# Patient Record
Sex: MALE | Race: WHITE | NOT HISPANIC OR LATINO | Employment: FULL TIME | URBAN - METROPOLITAN AREA
[De-identification: names, ages, dates, MRNs, and addresses within clinical notes are randomized per-mention and may not be internally consistent; named-entity substitution may affect disease eponyms.]

---

## 2018-12-10 ENCOUNTER — OFFICE VISIT (OUTPATIENT)
Dept: URGENT CARE | Facility: CLINIC | Age: 38
End: 2018-12-10
Payer: COMMERCIAL

## 2018-12-10 VITALS
DIASTOLIC BLOOD PRESSURE: 81 MMHG | BODY MASS INDEX: 35.55 KG/M2 | HEART RATE: 71 BPM | SYSTOLIC BLOOD PRESSURE: 128 MMHG | TEMPERATURE: 99 F | OXYGEN SATURATION: 99 % | HEIGHT: 69 IN | WEIGHT: 240 LBS

## 2018-12-10 DIAGNOSIS — M62.838 MUSCLE SPASM: ICD-10-CM

## 2018-12-10 DIAGNOSIS — M54.50 ACUTE RIGHT-SIDED LOW BACK PAIN WITHOUT SCIATICA: Primary | ICD-10-CM

## 2018-12-10 PROCEDURE — 96372 THER/PROPH/DIAG INJ SC/IM: CPT | Mod: S$GLB,,, | Performed by: INTERNAL MEDICINE

## 2018-12-10 PROCEDURE — 99203 OFFICE O/P NEW LOW 30 MIN: CPT | Mod: 25,S$GLB,, | Performed by: NURSE PRACTITIONER

## 2018-12-10 RX ORDER — PREDNISONE 20 MG/1
20 TABLET ORAL DAILY
Qty: 5 TABLET | Refills: 0 | Status: SHIPPED | OUTPATIENT
Start: 2018-12-10 | End: 2018-12-15

## 2018-12-10 RX ORDER — OMEPRAZOLE 20 MG/1
20 CAPSULE, DELAYED RELEASE ORAL DAILY
COMMUNITY

## 2018-12-10 RX ORDER — KETOROLAC TROMETHAMINE 30 MG/ML
60 INJECTION, SOLUTION INTRAMUSCULAR; INTRAVENOUS
Status: COMPLETED | OUTPATIENT
Start: 2018-12-10 | End: 2018-12-10

## 2018-12-10 RX ORDER — BETAMETHASONE SODIUM PHOSPHATE AND BETAMETHASONE ACETATE 3; 3 MG/ML; MG/ML
6 INJECTION, SUSPENSION INTRA-ARTICULAR; INTRALESIONAL; INTRAMUSCULAR; SOFT TISSUE
Status: COMPLETED | OUTPATIENT
Start: 2018-12-10 | End: 2018-12-10

## 2018-12-10 RX ORDER — NAPROXEN 500 MG/1
500 TABLET ORAL 2 TIMES DAILY WITH MEALS
Qty: 60 TABLET | Refills: 0 | Status: SHIPPED | OUTPATIENT
Start: 2018-12-10 | End: 2019-12-10

## 2018-12-10 RX ORDER — CYCLOBENZAPRINE HCL 10 MG
10 TABLET ORAL 3 TIMES DAILY PRN
Qty: 30 TABLET | Refills: 0 | Status: SHIPPED | OUTPATIENT
Start: 2018-12-10 | End: 2019-01-09

## 2018-12-10 RX ADMIN — BETAMETHASONE SODIUM PHOSPHATE AND BETAMETHASONE ACETATE 6 MG: 3; 3 INJECTION, SUSPENSION INTRA-ARTICULAR; INTRALESIONAL; INTRAMUSCULAR; SOFT TISSUE at 09:12

## 2018-12-10 RX ADMIN — KETOROLAC TROMETHAMINE 60 MG: 30 INJECTION, SOLUTION INTRAMUSCULAR; INTRAVENOUS at 09:12

## 2018-12-10 NOTE — LETTER
December 10, 2018      [facility]  [place]  [address] [place] [address]  Phone: [phone]  Fax: [phone]       Patient: Jose Antonio Dove   YOB: 1980  Date of Visit: 12/10/2018    To Whom It May Concern:    Mulu Dove  was at [facility] on 12/10/2018. He may return to work/school on 12/14/18 with no restrictions. Please excuse 12/9/18 as well.  If you have any questions or concerns, or if I can be of further assistance, please do not hesitate to contact me.    Sincerely,              Aleja Richardson NP

## 2018-12-10 NOTE — PATIENT INSTRUCTIONS
Back Pain (Acute or Chronic)    Back pain is one of the most common problems. The good news is that most people feel better in 1 to 2 weeks, and most of the rest in 1 to 2 months. Most people can remain active.  People experience and describe pain differently; not everyone is the same.  · The pain can be sharp, stabbing, shooting, aching, cramping or burning.  · Movement, standing, bending, lifting, sitting, or walking may worsen pain.  · It can be localized to one spot or area, or it can be more generalized.  · It can spread or radiate upwards, to the front, or go down your arms or legs (sciatica).  · It can cause muscle spasm.  Most of the time, mechanical problems with the muscles or spine cause the pain. Mechanical problems are usually caused by an injury to the muscles or ligaments. While illness can cause back pain, it is usually not caused by a serious illness. Mechanical problems include:   · Physical activity such as sports, exercise, work, or normal activity  · Overexertion, lifting, pushing, pulling incorrectly or too aggressively  · Sudden twisting, bending, or stretching from an accident, or accidental movement  · Poor posture  · Stretching or moving wrong, without noticing pain at the time  · Poor coordination, lack of regular exercise (check with your doctor about this)  · Spinal disc disease or arthritis  · Stress  Pain can also be related to pregnancy, or illness like appendicitis, bladder or kidney infections, pelvic infections, and many other things.  Acute back pain usually gets better in 1 to 2 weeks. Back pain related to disk disease, arthritis in the spinal joints or spinal stenosis (narrowing of the spinal canal) can become chronic and last for months or years.  Unless you had a physical injury (for example, a car accident or fall) X-rays are usually not needed for the initial evaluation of back pain. If pain continues and does not respond to medical treatment, X-rays and other tests may be  needed.  Home care  Try these home care recommendations:  · When in bed, try to find a position of comfort. A firm mattress is best. Try lying flat on your back with pillows under your knees. You can also try lying on your side with your knees bent up towards your chest and a pillow between your knees.  · At first, do not try to stretch out the sore spots. If there is a strain, it is not like the good soreness you get after exercising without an injury. In this case, stretching may make it worse.  · Avoid prolong sitting, long car rides, or travel. This puts more stress on the lower back than standing or walking.  · During the first 24 to 72 hours after an acute injury or flare up of chronic back pain, apply an ice pack to the painful area for 20 minutes and then remove it for 20 minutes. Do this over a period of 60 to 90 minutes or several times a day. This will reduce swelling and pain. Wrap the ice pack in a thin towel or plastic to protect your skin.  · You can start with ice, then switch to heat. Heat (hot shower, hot bath, or heating pad) reduces pain and works well for muscle spasms. Heat can be applied to the painful area for 20 minutes then remove it for 20 minutes. Do this over a period of 60 to 90 minutes or several times a day. Do not sleep on a heating pad. It can lead to skin burns or tissue damage.  · You can alternate ice and heat therapy. Talk with your doctor about the best treatment for your back pain.  · Therapeutic massage can help relax the back muscles without stretching them.  · Be aware of safe lifting methods and do not lift anything without stretching first.  Medicines  Talk to your doctor before using medicine, especially if you have other medical problems or are taking other medicines.  · You may use over-the-counter medicine as directed on the bottle to control pain, unless another pain medicine was prescribed. If you have chronic conditions like diabetes, liver or kidney disease,  stomach ulcers, or gastrointestinal bleeding, or are taking blood thinners, talk to your doctor before taking any medicine.  · Be careful if you are given a prescription medicines, narcotics, or medicine for muscle spasms. They can cause drowsiness, affect your coordination, reflexes, and judgement. Do not drive or operate heavy machinery.  Follow-up care  Follow up with your healthcare provider, or as advised.   A radiologist will review any X-rays that were taken. Your provide will notify you of any new findings that may affect your care.  Call 911  Call emergency services if any of the following occur:  · Trouble breathing  · Confusion  · Very drowsy or trouble awakening  · Fainting or loss of consciousness  · Rapid or very slow heart rate  · Loss of bowel or bladder control  When to seek medical advice  Call your healthcare provider right away if any of these occur:   · Pain becomes worse or spreads to your legs  · Weakness or numbness in one or both legs  · Numbness in the groin or genital area  Date Last Reviewed: 7/1/2016 © 2000-2017 [employer]. [address] [place] [address]. All rights reserved. This information is not intended as a substitute for professional medical care. Always follow your healthcare professional's instructions.        Self-Care for Low Back Pain    Most people have low back pain now and then. In many cases, it isnt serious and self-care can help. Sometimes low back pain can be a sign of a bigger problem. Call your healthcare provider if your pain returns often or gets worse over time. For the long-term care of your back, get regular exercise, lose any excess weight and learn good posture.  Take a short rest  Lying down during the day may be beneficial for short periods of time if severe pain increases with sitting or standing. Long-term bed rest could be detrimental.  Reduce pain and swelling  Cold reduces swelling. Both cold and heat can reduce pain. Protect  your skin by placing a towel between your body and the ice or heat source.  · For the first few days, apply an ice pack for 15 to 20 minutes .  · After the first few days, try heat for 15 minutes at a time to ease pain. Never sleep on a heating pad.  · Over-the-counter medicine can help control pain and swelling. Try aspirin or ibuprofen.  Exercise  Exercise can help your back heal. It also helps your back get stronger and more flexible, preventing any reinjury. Ask your healthcare provider about specific exercises for your back.  Use good posture to avoid reinjury  · When moving, bend at the hips and knees. Dont bend at the waist or twist around.  · When lifting, keep the object close to your body. Dont try to lift more than you can handle.  · When sitting, keep your lower back supported. Use a rolled-up towel as needed.  Seek immediate medical care if:  · Youre unable to stand or walk.  · You have a temperature over 100.4°F (38.0°C)  · You have frequent, painful, or bloody urination.  · You have severe abdominal pain.  · You have a sharp, stabbing pain.  · Your pain is constant.  · You have pain or numbness in your leg.  · You feel pain in a new area of your back.  · You notice that the pain isnt decreasing after more than a week.   Date Last Reviewed: 9/29/2015  © 0414-4949 [employer]. [address] [place] [address], [place] [address]. All rights reserved. This information is not intended as a substitute for professional medical care. Always follow your healthcare professional's instructions.        Relieving Back Pain  Back pain is a common problem. You can strain back muscles by lifting too much weight or just by moving the wrong way. Back strain can be uncomfortable, even painful. And it can take weeks or months to improve. To help yourself feel better and prevent future back strains, try these tips.  Important Note: Do not give aspirin to children or teens without first discussing it with your  healthcare provider.      ? Ice    Ice reduces muscle pain and swelling. It helps most during the first 24 to 48 hours after an injury.  · Wrap an ice pack or a bag of frozen peas in a thin towel. (Never place ice directly on your skin.)  · Place the ice where your back hurts the most.  · Dont ice for more than 20 minutes at a time.  · You can use ice several times a day.  ? Medicines  Over-the-counter pain relievers can include acetaminophen and anti-inflammatory medicines, which includes aspirin or ibuprofen. They can help ease discomfort. Some also reduce swelling.  · Tell your healthcare provider about any medicines you are already taking.  · Take medicines only as directed.  ? Heat  After the first 48 hours, heat can relax sore muscles and improve blood flow.  · Try a warm bath or shower. Or use a heating pad set on low. To prevent a burn, keep a cloth between you and the heating pad.  · Dont use a heating pad for more than 15 minutes at a time. Never sleep on a heating pad.  Date Last Reviewed: 9/1/2015  © 3036-3295 [employer]. [address] [place] [address]. All rights reserved. This information is not intended as a substitute for professional medical care. Always follow your healthcare professional's instructions.

## 2018-12-10 NOTE — PROGRESS NOTES
"Subjective:       Patient ID: Jose Antonio Dove is a 38 y.o. male.    Vitals:  height is 5' 9" (1.753 m) and weight is 108.9 kg (240 lb). His oral temperature is 98.5 °F (36.9 °C). His blood pressure is 128/81 and his pulse is 71. His oxygen saturation is 99%.     Chief Complaint: Back Pain    Back Pain   This is a new problem. The current episode started yesterday. The problem occurs constantly. The problem has been gradually worsening since onset. The pain is present in the lumbar spine. The quality of the pain is described as aching. The pain does not radiate. The pain is at a severity of 8/10. The pain is moderate. The pain is the same all the time. Stiffness is present all day. Pertinent negatives include no bladder incontinence, bowel incontinence, chest pain, dysuria, fever, headaches, leg pain, numbness, tingling or weakness. He has tried nothing for the symptoms.       Constitution: Negative for chills, fatigue and fever.   HENT: Negative for congestion and sore throat.    Neck: Negative for painful lymph nodes.   Cardiovascular: Negative for chest pain and leg swelling.   Eyes: Negative for double vision and blurred vision.   Respiratory: Negative for cough and shortness of breath.    Gastrointestinal: Negative for nausea, vomiting, diarrhea and bowel incontinence.   Genitourinary: Negative for dysuria, frequency, urgency and bladder incontinence.   Musculoskeletal: Positive for back pain. Negative for joint pain, joint swelling, muscle cramps and muscle ache.   Skin: Negative for color change, pale and rash.   Allergic/Immunologic: Negative for seasonal allergies.   Neurological: Negative for dizziness, history of vertigo, light-headedness, passing out, headaches and numbness.   Hematologic/Lymphatic: Negative for swollen lymph nodes, easy bruising/bleeding and history of blood clots. Does not bruise/bleed easily.   Psychiatric/Behavioral: Negative for nervous/anxious, sleep disturbance and depression. The " patient is not nervous/anxious.        Objective:      Physical Exam   Constitutional: He is oriented to person, place, and time. He appears well-developed and well-nourished.   HENT:   Head: Normocephalic and atraumatic.   Cardiovascular: Normal rate, regular rhythm and normal heart sounds.   Pulmonary/Chest: Effort normal and breath sounds normal.   Abdominal: Soft. Bowel sounds are normal.   Musculoskeletal: He exhibits tenderness. He exhibits no edema.        Back:    Neurological: He is alert and oriented to person, place, and time.   Skin: Skin is warm and dry. No pallor.   Psychiatric: He has a normal mood and affect. His behavior is normal. Judgment and thought content normal.   Nursing note and vitals reviewed.      Assessment:       1. Acute right-sided low back pain without sciatica    2. Muscle spasm        Plan:         1. Acute right-sided low back pain without sciatica    - betamethasone acetate-betamethasone sodium phosphate injection 6 mg  - ketorolac injection 60 mg  - cyclobenzaprine (FLEXERIL) 10 MG tablet; Take 1 tablet (10 mg total) by mouth 3 (three) times daily as needed for Muscle spasms.  Dispense: 30 tablet; Refill: 0  - predniSONE (DELTASONE) 20 MG tablet; Take 1 tablet (20 mg total) by mouth once daily. Starting tomorrow. for 5 days  Dispense: 5 tablet; Refill: 0  - naproxen (NAPROSYN) 500 MG tablet; Take 1 tablet (500 mg total) by mouth 2 (two) times daily with meals. Start tomorrow.  Dispense: 60 tablet; Refill: 0    2. Muscle spasm    - betamethasone acetate-betamethasone sodium phosphate injection 6 mg  - ketorolac injection 60 mg  - cyclobenzaprine (FLEXERIL) 10 MG tablet; Take 1 tablet (10 mg total) by mouth 3 (three) times daily as needed for Muscle spasms.  Dispense: 30 tablet; Refill: 0  - predniSONE (DELTASONE) 20 MG tablet; Take 1 tablet (20 mg total) by mouth once daily. Starting tomorrow. for 5 days  Dispense: 5 tablet; Refill: 0  - naproxen (NAPROSYN) 500 MG tablet; Take 1  tablet (500 mg total) by mouth 2 (two) times daily with meals. Start tomorrow.  Dispense: 60 tablet; Refill: 0

## 2018-12-13 ENCOUNTER — TELEPHONE (OUTPATIENT)
Dept: URGENT CARE | Facility: CLINIC | Age: 38
End: 2018-12-13

## 2018-12-17 ENCOUNTER — TELEPHONE (OUTPATIENT)
Dept: URGENT CARE | Facility: CLINIC | Age: 38
End: 2018-12-17

## 2021-06-29 ENCOUNTER — OFFICE VISIT (OUTPATIENT)
Dept: URGENT CARE | Facility: CLINIC | Age: 41
End: 2021-06-29
Payer: COMMERCIAL

## 2021-06-29 VITALS
HEART RATE: 94 BPM | OXYGEN SATURATION: 98 % | HEIGHT: 69 IN | TEMPERATURE: 99 F | RESPIRATION RATE: 18 BRPM | DIASTOLIC BLOOD PRESSURE: 94 MMHG | SYSTOLIC BLOOD PRESSURE: 129 MMHG | WEIGHT: 235 LBS | BODY MASS INDEX: 34.8 KG/M2

## 2021-06-29 DIAGNOSIS — Z11.59 SCREENING FOR VIRAL DISEASE: Primary | ICD-10-CM

## 2021-06-29 DIAGNOSIS — U07.1 COVID-19 VIRUS DETECTED: ICD-10-CM

## 2021-06-29 DIAGNOSIS — U07.1 COVID-19 VIRUS INFECTION: ICD-10-CM

## 2021-06-29 LAB
CTP QC/QA: YES
SARS-COV-2 RDRP RESP QL NAA+PROBE: POSITIVE

## 2021-06-29 PROCEDURE — U0002: ICD-10-PCS | Mod: QW,CR,S$GLB, | Performed by: PHYSICIAN ASSISTANT

## 2021-06-29 PROCEDURE — 99214 OFFICE O/P EST MOD 30 MIN: CPT | Mod: S$GLB,CS,, | Performed by: PHYSICIAN ASSISTANT

## 2021-06-29 PROCEDURE — U0002 COVID-19 LAB TEST NON-CDC: HCPCS | Mod: QW,CR,S$GLB, | Performed by: PHYSICIAN ASSISTANT

## 2021-06-29 PROCEDURE — 99214 PR OFFICE/OUTPT VISIT, EST, LEVL IV, 30-39 MIN: ICD-10-PCS | Mod: S$GLB,CS,, | Performed by: PHYSICIAN ASSISTANT

## 2021-06-29 RX ORDER — PREDNISONE 20 MG/1
20 TABLET ORAL DAILY
Qty: 5 TABLET | Refills: 0 | Status: SHIPPED | OUTPATIENT
Start: 2021-06-29 | End: 2021-07-04

## 2021-06-29 RX ORDER — AZITHROMYCIN 250 MG/1
250 TABLET, FILM COATED ORAL SEE ADMIN INSTRUCTIONS
Qty: 6 TABLET | Refills: 0 | Status: SHIPPED | OUTPATIENT
Start: 2021-06-29

## 2021-06-29 RX ORDER — PROMETHAZINE HYDROCHLORIDE AND DEXTROMETHORPHAN HYDROBROMIDE 6.25; 15 MG/5ML; MG/5ML
5 SYRUP ORAL EVERY 4 HOURS PRN
Qty: 180 ML | Refills: 0 | Status: SHIPPED | OUTPATIENT
Start: 2021-06-29 | End: 2021-07-09

## 2021-07-02 ENCOUNTER — INFUSION (OUTPATIENT)
Dept: INFECTIOUS DISEASES | Facility: HOSPITAL | Age: 41
End: 2021-07-02
Attending: FAMILY MEDICINE
Payer: COMMERCIAL

## 2021-07-02 VITALS
TEMPERATURE: 98 F | DIASTOLIC BLOOD PRESSURE: 70 MMHG | RESPIRATION RATE: 20 BRPM | HEART RATE: 72 BPM | SYSTOLIC BLOOD PRESSURE: 140 MMHG | OXYGEN SATURATION: 97 %

## 2021-07-02 DIAGNOSIS — U07.1 COVID-19: Primary | ICD-10-CM

## 2021-07-02 PROCEDURE — 25000003 PHARM REV CODE 250: Performed by: FAMILY MEDICINE

## 2021-07-02 PROCEDURE — M0243 CASIRIVI AND IMDEVI INFUSION: HCPCS | Performed by: FAMILY MEDICINE

## 2021-07-02 PROCEDURE — 63600175 PHARM REV CODE 636 W HCPCS: Performed by: FAMILY MEDICINE

## 2021-07-02 RX ORDER — ACETAMINOPHEN 325 MG/1
650 TABLET ORAL ONCE AS NEEDED
Status: ACTIVE | OUTPATIENT
Start: 2021-07-02 | End: 2032-11-28

## 2021-07-02 RX ORDER — SODIUM CHLORIDE 0.9 % (FLUSH) 0.9 %
10 SYRINGE (ML) INJECTION
Status: ACTIVE | OUTPATIENT
Start: 2021-07-02

## 2021-07-02 RX ORDER — DIPHENHYDRAMINE HYDROCHLORIDE 50 MG/ML
25 INJECTION INTRAMUSCULAR; INTRAVENOUS ONCE AS NEEDED
Status: ACTIVE | OUTPATIENT
Start: 2021-07-02 | End: 2032-11-28

## 2021-07-02 RX ORDER — EPINEPHRINE 0.3 MG/.3ML
0.3 INJECTION SUBCUTANEOUS
Status: ACTIVE | OUTPATIENT
Start: 2021-07-02

## 2021-07-02 RX ORDER — ONDANSETRON 4 MG/1
4 TABLET, ORALLY DISINTEGRATING ORAL ONCE AS NEEDED
Status: ACTIVE | OUTPATIENT
Start: 2021-07-02 | End: 2032-11-28

## 2021-07-02 RX ADMIN — CASIRIVIMAB 600 MG: 300 INJECTION, SOLUTION, CONCENTRATE INTRAVENOUS at 12:07

## 2023-07-22 DIAGNOSIS — G47.8 OTHER SLEEP DISORDERS: Primary | ICD-10-CM

## 2023-07-27 ENCOUNTER — HOSPITAL ENCOUNTER (OUTPATIENT)
Dept: SLEEP MEDICINE | Facility: HOSPITAL | Age: 43
Discharge: HOME | End: 2023-07-27
Attending: NURSE PRACTITIONER
Payer: COMMERCIAL

## 2023-07-27 DIAGNOSIS — G47.8 OTHER SLEEP DISORDERS: ICD-10-CM

## 2023-07-27 PROCEDURE — 95806 SLEEP STUDY UNATT&RESP EFFT: CPT

## 2023-07-31 PROBLEM — G47.8 OTHER SLEEP DISORDERS: Status: ACTIVE | Noted: 2023-07-31
